# Patient Record
Sex: MALE | Race: BLACK OR AFRICAN AMERICAN | NOT HISPANIC OR LATINO | URBAN - METROPOLITAN AREA
[De-identification: names, ages, dates, MRNs, and addresses within clinical notes are randomized per-mention and may not be internally consistent; named-entity substitution may affect disease eponyms.]

---

## 2017-03-18 ENCOUNTER — EMERGENCY (EMERGENCY)
Facility: HOSPITAL | Age: 57
LOS: 0 days | Discharge: HOME | End: 2017-03-18

## 2017-03-24 ENCOUNTER — EMERGENCY (EMERGENCY)
Facility: HOSPITAL | Age: 57
LOS: 0 days | Discharge: HOME | End: 2017-03-24
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
## 2017-06-27 DIAGNOSIS — S01.111D LACERATION WITHOUT FOREIGN BODY OF RIGHT EYELID AND PERIOCULAR AREA, SUBSEQUENT ENCOUNTER: ICD-10-CM
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
## 2017-07-18 DIAGNOSIS — S01.111A LACERATION WITHOUT FOREIGN BODY OF RIGHT EYELID AND PERIOCULAR AREA, INITIAL ENCOUNTER: ICD-10-CM

## 2017-07-18 DIAGNOSIS — Z23 ENCOUNTER FOR IMMUNIZATION: ICD-10-CM
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
## 2017-07-18 DIAGNOSIS — S05.11XA CONTUSION OF EYEBALL AND ORBITAL TISSUES, RIGHT EYE, INITIAL ENCOUNTER: ICD-10-CM

## 2019-06-16 ENCOUNTER — EMERGENCY (EMERGENCY)
Facility: HOSPITAL | Age: 59
LOS: 0 days | Discharge: HOME | End: 2019-06-16
Payer: COMMERCIAL

## 2019-06-16 VITALS
RESPIRATION RATE: 17 BRPM | OXYGEN SATURATION: 97 % | DIASTOLIC BLOOD PRESSURE: 74 MMHG | SYSTOLIC BLOOD PRESSURE: 149 MMHG | TEMPERATURE: 98 F | HEART RATE: 94 BPM
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
## 2019-06-16 DIAGNOSIS — S69.92XA UNSPECIFIED INJURY OF LEFT WRIST, HAND AND FINGER(S), INITIAL ENCOUNTER: ICD-10-CM
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
## 2019-06-16 DIAGNOSIS — M79.646 PAIN IN UNSPECIFIED FINGER(S): ICD-10-CM
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
## 2019-06-16 PROCEDURE — 73130 X-RAY EXAM OF HAND: CPT | Mod: 26,LT

## 2019-06-16 PROCEDURE — 99283 EMERGENCY DEPT VISIT LOW MDM: CPT

## 2019-06-16 NOTE — ED PROVIDER NOTE - OBJECTIVE STATEMENT
57 y/o male with hx HTN presents to the ED c/o "I was at work and the lift gate closed on my left 5th finger. I put ice on it and it feels better." no numbness/ tingling/ weakness

## 2019-06-16 NOTE — ED PROVIDER NOTE - CARE PROVIDER_API CALL
Han Munoz)  Orthopaedic Surgery  [address] [place] [address]  Phone: [phone]  Fax: [phone]  Follow Up Time:

## 2019-06-16 NOTE — ED ADULT NURSE NOTE - OBJECTIVE STATEMENT
Pt a&ox3, c/o pain to left fifth finger, pt states he was working and gate closed on finger. Pt able to move finger, palpable pulses, no discoloration.

## 2023-07-02 ENCOUNTER — EMERGENCY (EMERGENCY)
Facility: HOSPITAL | Age: 63
LOS: 0 days | Discharge: HOME | End: 2023-07-02
Attending: EMERGENCY MEDICINE
Payer: SELF-PAY

## 2023-07-02 VITALS
WEIGHT: 210.1 LBS | DIASTOLIC BLOOD PRESSURE: 62 MMHG | RESPIRATION RATE: 18 BRPM | OXYGEN SATURATION: 98 % | HEART RATE: 94 BPM | SYSTOLIC BLOOD PRESSURE: 103 MMHG | HEIGHT: 71 IN | TEMPERATURE: 98 F

## 2023-07-02 DIAGNOSIS — J06.9 ACUTE UPPER RESPIRATORY INFECTION, UNSPECIFIED: ICD-10-CM

## 2023-07-02 DIAGNOSIS — M54.50 LOW BACK PAIN, UNSPECIFIED: ICD-10-CM

## 2023-07-02 DIAGNOSIS — R05.1 ACUTE COUGH: ICD-10-CM

## 2023-07-02 DIAGNOSIS — M79.10 MYALGIA, UNSPECIFIED SITE: ICD-10-CM

## 2023-07-02 PROBLEM — I10 ESSENTIAL (PRIMARY) HYPERTENSION: Chronic | Status: ACTIVE | Noted: 2019-06-16

## 2023-07-02 PROCEDURE — 99283 EMERGENCY DEPT VISIT LOW MDM: CPT | Mod: 25

## 2023-07-02 PROCEDURE — 99284 EMERGENCY DEPT VISIT MOD MDM: CPT

## 2023-07-02 PROCEDURE — 71046 X-RAY EXAM CHEST 2 VIEWS: CPT | Mod: 26

## 2023-07-02 PROCEDURE — 71046 X-RAY EXAM CHEST 2 VIEWS: CPT

## 2023-07-02 RX ORDER — IBUPROFEN 200 MG
600 TABLET ORAL ONCE
Refills: 0 | Status: COMPLETED | OUTPATIENT
Start: 2023-07-02 | End: 2023-07-02

## 2023-07-02 RX ADMIN — Medication 600 MILLIGRAM(S): at 12:56

## 2023-07-02 NOTE — ED PROVIDER NOTE - OBJECTIVE STATEMENT
62-year-old male no notable past medical history coming in with complaints of body aches.  Patient reports that for the past 2 to 3 days he has had generalized body aches, some lower back pain, cough, subjective chills.  Wanted to know if he had a virus, vaccinated against COVID. Non-smoker. Denies any nausea, vomiting, CP, SOB, changes in urination, or changes in bowel movements.

## 2023-07-02 NOTE — ED PROVIDER NOTE - ATTENDING CONTRIBUTION TO CARE
I personally evaluated the patient. I reviewed the Resident´s or Physician Assistant´s note (as assigned above), and agree with the findings and plan except as documented in my note.    62-year-old male presents to the emergency department complaining of myalgias and cough productive of white sputum no fever or chills no sick contacts.  No travel history.    GENERAL: Male in no distress.   HEENT: EOMI non icteric normal mucosa  CHEST: normal work of breathing noted no accessory muscle use clear to auscultation  CV: pulses intact   EXTR: FROM     NEURO: AAO 3 no focal deficits  SKIN: normal no pallor    Impression: URI    Plan: Imaging, reevaluation, likely outpatient management

## 2023-07-02 NOTE — ED ADULT NURSE NOTE - NSFALLUNIVINTERV_ED_ALL_ED
Bed/Stretcher in lowest position, wheels locked, appropriate side rails in place/Call bell, personal items and telephone in reach/Instruct patient to call for assistance before getting out of bed/chair/stretcher/Non-slip footwear applied when patient is off stretcher/Branch to call system/Physically safe environment - no spills, clutter or unnecessary equipment/Purposeful proactive rounding/Room/bathroom lighting operational, light cord in reach

## 2023-07-02 NOTE — ED PROVIDER NOTE - PATIENT PORTAL LINK FT
You can access the FollowMyHealth Patient Portal offered by [facility] [place] by registering at the following website: [email or web address]. By joining [employer]’s FollowMyHealth portal, you will also be able to view your health information using other applications (apps) compatible with our system.

## 2023-07-02 NOTE — ED PROVIDER NOTE - CLINICAL SUMMARY MEDICAL DECISION MAKING FREE TEXT BOX
62-year-old male presents to the emergency department for myalgias back pain and cough.  In the emergency department had screening exam, reevaluation, no acute emergent findings, will discharge with outpatient management and return and follow-up instructions.  At the time of disposition, results of work-up discussed with patient with questions answered, expressed understanding of the medical decision making.

## 2023-07-02 NOTE — ED PROVIDER NOTE - PHYSICAL EXAMINATION
CONSTITUTIONAL: NAD  SKIN: Warm dry  HEAD: NCAT  EYES: NL inspection  ENT: MMM  NECK: Supple; non tender.  CARD: RRR  RESP: CTAB, no rales, wheezing noted  ABD: S/NT no R/G  EXT: no pedal edema  NEURO: Grossly unremarkable  PSYCH: Cooperative, appropriate.